# Patient Record
Sex: FEMALE | Race: OTHER | NOT HISPANIC OR LATINO | Employment: PART TIME | URBAN - METROPOLITAN AREA
[De-identification: names, ages, dates, MRNs, and addresses within clinical notes are randomized per-mention and may not be internally consistent; named-entity substitution may affect disease eponyms.]

---

## 2019-12-16 ENCOUNTER — HOSPITAL ENCOUNTER (EMERGENCY)
Facility: HOSPITAL | Age: 24
Discharge: HOME | End: 2019-12-16
Attending: EMERGENCY MEDICINE
Payer: COMMERCIAL

## 2019-12-16 VITALS
HEART RATE: 110 BPM | HEIGHT: 63 IN | BODY MASS INDEX: 21.17 KG/M2 | TEMPERATURE: 97.6 F | RESPIRATION RATE: 16 BRPM | WEIGHT: 119.49 LBS | DIASTOLIC BLOOD PRESSURE: 85 MMHG | SYSTOLIC BLOOD PRESSURE: 146 MMHG | OXYGEN SATURATION: 100 %

## 2019-12-16 DIAGNOSIS — K03.81 CRACKED TOOTH: ICD-10-CM

## 2019-12-16 DIAGNOSIS — G89.29 CHRONIC NONINTRACTABLE HEADACHE, UNSPECIFIED HEADACHE TYPE: Primary | ICD-10-CM

## 2019-12-16 DIAGNOSIS — K08.89 PAIN, DENTAL: ICD-10-CM

## 2019-12-16 DIAGNOSIS — R51.9 CHRONIC NONINTRACTABLE HEADACHE, UNSPECIFIED HEADACHE TYPE: Primary | ICD-10-CM

## 2019-12-16 PROCEDURE — 99283 EMERGENCY DEPT VISIT LOW MDM: CPT

## 2019-12-16 PROCEDURE — 99284 EMERGENCY DEPT VISIT MOD MDM: CPT | Performed by: EMERGENCY MEDICINE

## 2019-12-16 RX ORDER — PENICILLIN V POTASSIUM 500 MG/1
500 TABLET ORAL EVERY 6 HOURS SCHEDULED
Qty: 40 TABLET | Refills: 0 | Status: SHIPPED | OUTPATIENT
Start: 2019-12-16 | End: 2019-12-26

## 2019-12-16 RX ORDER — GABAPENTIN 100 MG/1
100 CAPSULE ORAL 3 TIMES DAILY
Qty: 42 CAPSULE | Refills: 0 | Status: SHIPPED | OUTPATIENT
Start: 2019-12-16 | End: 2019-12-30

## 2019-12-16 RX ORDER — PENICILLIN V POTASSIUM 250 MG/1
500 TABLET ORAL ONCE
Status: COMPLETED | OUTPATIENT
Start: 2019-12-16 | End: 2019-12-16

## 2019-12-16 RX ORDER — GABAPENTIN 100 MG/1
100 CAPSULE ORAL ONCE
Status: COMPLETED | OUTPATIENT
Start: 2019-12-16 | End: 2019-12-16

## 2019-12-16 RX ADMIN — GABAPENTIN 100 MG: 100 CAPSULE ORAL at 17:54

## 2019-12-16 RX ADMIN — PENICILLIN V POTASSIUM 500 MG: 250 TABLET, FILM COATED ORAL at 17:53

## 2019-12-16 NOTE — DISCHARGE INSTRUCTIONS
Today you were seen in the emergency department for headache likely related to tooth pain  We will give you an antibiotics next and days and have a follow-up dental clinic  You should also see dental surgeon  Please return for any symptoms such as fever or any other concerning symptoms

## 2019-12-17 NOTE — ED PROVIDER NOTES
History  Chief Complaint   Patient presents with    Migraine     pt c/o migraine that started earlier today  Pt states she took naproxen with no relief  Pt states she has been having migraines practically every day recently and is worried why this is happening  This is a 80-year-old female with no significant past medical history presenting today with headaches for the past couple weeks  She says they are slightly worse than her usual migraines and they are primarily in the center of her forehead  She says she feels as though her tooth pain from her left upper wisdom tooth is causing her to have pain that radiates up through her cheek into her head  She denies any fever, chills, nausea, vomiting, diarrhea  She denies any other infectious symptoms  She denies any neurologic symptoms  She denies any bowel or bladder dysfunction  Her headaches are not sudden or maximal in intensity  She says that they usually get worse and did not relieve with typical therapy such as Tylenol, ibuprofen, Excedrin  She says that sometimes that disrupt her sleep as well  Denies any IV drug use and she denies smoking  She does drink alcohol recreationally  History provided by:  Patient   used: No    Migraine   Severity:  Moderate  Onset quality:  Gradual  Timing:  Intermittent  Progression:  Worsening  Chronicity:  Recurrent  Associated symptoms: headaches    Associated symptoms: no abdominal pain, no chest pain, no fever, no myalgias, no nausea, no rash, no shortness of breath and no vomiting        None       History reviewed  No pertinent past medical history  History reviewed  No pertinent surgical history  History reviewed  No pertinent family history  I have reviewed and agree with the history as documented      Social History     Tobacco Use    Smoking status: Never Smoker    Smokeless tobacco: Never Used   Substance Use Topics    Alcohol use: Yes     Comment: socially    Drug use: Never        Review of Systems   Constitutional: Negative for chills, diaphoresis and fever  HENT: Negative  Eyes: Negative  Negative for visual disturbance  Respiratory: Negative  Negative for shortness of breath  Cardiovascular: Negative  Negative for chest pain  Gastrointestinal: Negative  Negative for abdominal pain, nausea and vomiting  Endocrine: Negative  Genitourinary: Negative  Musculoskeletal: Negative  Negative for myalgias  Skin: Negative  Negative for rash  Allergic/Immunologic: Negative  Neurological: Positive for headaches  Negative for weakness, light-headedness and numbness  Hematological: Negative  Psychiatric/Behavioral: Negative  All other systems reviewed and are negative  Physical Exam  ED Triage Vitals [12/16/19 1559]   Temperature Pulse Respirations Blood Pressure SpO2   97 6 °F (36 4 °C) (!) 110 16 146/85 100 %      Temp Source Heart Rate Source Patient Position - Orthostatic VS BP Location FiO2 (%)   Tympanic Monitor Sitting Right arm --      Pain Score       --             Orthostatic Vital Signs  Vitals:    12/16/19 1559   BP: 146/85   Pulse: (!) 110   Patient Position - Orthostatic VS: Sitting       Physical Exam   Constitutional: She is oriented to person, place, and time  She appears well-developed and well-nourished  HENT:   Head: Normocephalic and atraumatic  Mouth/Throat: Oropharynx is clear and moist  Abnormal dentition  Dental caries present  Multiple cracked teeth with dental caries  She does pain to palpation of her tooth 16  Eyes: Pupils are equal, round, and reactive to light  Conjunctivae and EOM are normal    Neck: Normal range of motion  Neck supple  Cardiovascular: Normal rate and regular rhythm  Pulmonary/Chest: Effort normal and breath sounds normal    Abdominal: Soft  Bowel sounds are normal  She exhibits no distension  There is no tenderness  Musculoskeletal: Normal range of motion     Neurological: She is alert and oriented to person, place, and time  She has normal strength  No cranial nerve deficit or sensory deficit  She displays a negative Romberg sign  Coordination and gait normal  GCS eye subscore is 4  GCS verbal subscore is 5  GCS motor subscore is 6  Skin: Skin is warm and dry  Psychiatric: She has a normal mood and affect  Nursing note and vitals reviewed  ED Medications  Medications   penicillin V potassium (VEETID) tablet 500 mg (500 mg Oral Given 12/16/19 1753)   gabapentin (NEURONTIN) capsule 100 mg (100 mg Oral Given 12/16/19 1754)       Diagnostic Studies  Results Reviewed     None                 No orders to display         Procedures  Procedures      ED Course                               MDM  Number of Diagnoses or Management Options  Chronic nonintractable headache, unspecified headache type: new and does not require workup  Cracked tooth: new and does not require workup  Pain, dental: new and does not require workup  Diagnosis management comments: 79-year-old presenting with dental pain causing headache  No current infectious symptoms  Will treat as though she has a dental infection with penicillin  Patient declined dental block  We will give her gabapentin here give her 2 weeks of event in for headache  Will have her follow up with the dental clinic as she likely needs dental surgery to remove her cracked tooth  Answered all her questions  Provided appropriate discharge instructions and return precautions  Patient is agreeable to plan         Amount and/or Complexity of Data Reviewed  Review and summarize past medical records: yes  Discuss the patient with other providers: yes    Patient Progress  Patient progress: stable        Disposition  Final diagnoses:   Chronic nonintractable headache, unspecified headache type   Pain, dental   Cracked tooth     Time reflects when diagnosis was documented in both MDM as applicable and the Disposition within this note     Time User Action Codes Description Comment    12/16/2019  6:01 PM Erenest Ringer Add [R51] Chronic nonintractable headache, unspecified headache type     12/16/2019  6:01 PM Erenest Ringer Add [K08 89] Pain, dental     12/16/2019  6:01 PM Erenest Ringer Add [K03 81] Cracked tooth       ED Disposition     ED Disposition Condition Date/Time Comment    Discharge Stable Mon Dec 16, 2019  6:00 PM Kareem Garnica discharge to home/self care  Follow-up Information     Follow up With Specialties Details Stefan Ramos  Call   [address] [place] [address] [id number]  [address]  [phone]          Discharge Medication List as of 12/16/2019  6:04 PM      START taking these medications    Details   gabapentin (NEURONTIN) 100 mg capsule Take 1 capsule (100 mg total) by mouth 3 (three) times a day for 14 days Take 1 capsule by mouth 3 (three) times a day for 14 days, Starting Mon 12/16/2019, Until Mon 12/30/2019, Print      penicillin V potassium (VEETID) 500 mg tablet Take 1 tablet (500 mg total) by mouth every 6 (six) hours for 10 days, Starting Mon 12/16/2019, Until Thu 12/26/2019, Print           No discharge procedures on file  ED Provider  Attending physically available and evaluated Jany Alberts I managed the patient along with the ED Attending      Electronically Signed by         Naty Vallecillo MD  12/16/19 1502

## 2019-12-29 NOTE — ED ATTENDING ATTESTATION
12/16/2019  I, Diogenes Torres MD, saw and evaluated the patient  I have discussed the patient with the resident/non-physician practitioner and agree with the resident's/non-physician practitioner's findings, Plan of Care, and MDM as documented in the resident's/non-physician practitioner's note, except where noted  All available labs and Radiology studies were reviewed  I was present for key portions of any procedure(s) performed by the resident/non-physician practitioner and I was immediately available to provide assistance  At this point I agree with the current assessment done in the Emergency Department  I have conducted an independent evaluation of this patient a history and physical is as follows:    ED Course     Patient presents for evaluation due to several some aches that are slightly worse than her normal baseline migraine  She describes the pain as being in the center of her forehead  She believes the pain is coming from her left upper wisdom tooth and radiating her cheek  She denies any trauma, numbness, weakness, or fevers  No additional complaints  Exam: AAOx3, NAD, RRR, CTA, poor dentition  A/P:  Headache, dental pain  No tenderness over the zygoma or maxillary sinus  No indications or concerns that dental infection has spread into a more significant abscess    Will treat with antibiotics and gabapentin and have patient follow up with Dental     Critical Care Time  Procedures